# Patient Record
Sex: FEMALE | Race: BLACK OR AFRICAN AMERICAN | NOT HISPANIC OR LATINO
[De-identification: names, ages, dates, MRNs, and addresses within clinical notes are randomized per-mention and may not be internally consistent; named-entity substitution may affect disease eponyms.]

---

## 2019-08-16 ENCOUNTER — TRANSCRIPTION ENCOUNTER (OUTPATIENT)
Age: 24
End: 2019-08-16

## 2024-03-15 PROBLEM — Z00.00 ENCOUNTER FOR PREVENTIVE HEALTH EXAMINATION: Status: ACTIVE | Noted: 2024-03-15

## 2024-03-18 ENCOUNTER — APPOINTMENT (OUTPATIENT)
Dept: ORTHOPEDIC SURGERY | Facility: CLINIC | Age: 29
End: 2024-03-18
Payer: MEDICAID

## 2024-03-18 VITALS — SYSTOLIC BLOOD PRESSURE: 135 MMHG | HEART RATE: 65 BPM | DIASTOLIC BLOOD PRESSURE: 90 MMHG | HEIGHT: 67 IN

## 2024-03-18 DIAGNOSIS — S62.616A DISPLACED FRACTURE OF PROXIMAL PHALANX OF RIGHT LITTLE FINGER, INITIAL ENCOUNTER FOR CLOSED FRACTURE: ICD-10-CM

## 2024-03-18 DIAGNOSIS — M79.641 PAIN IN RIGHT HAND: ICD-10-CM

## 2024-03-18 PROCEDURE — 73130 X-RAY EXAM OF HAND: CPT | Mod: RT

## 2024-03-18 PROCEDURE — 99203 OFFICE O/P NEW LOW 30 MIN: CPT | Mod: 25

## 2024-03-18 NOTE — DISCUSSION/SUMMARY
[de-identified] : Right fifth proximal phalanx fracture with mild angulation, healing.  Discussed with her that while she does have some displacement of the fracture, the alignment is overall acceptable.  We discussed treatment options.  With nonsurgical treatment, she may have a mild deformity of the finger, but after the fracture heals and she does hand therapy, she should have good function of the finger.  We also discussed surgical treatment which include open reduction internal fixation.  While this would have the benefit of improving the alignment of the fracture, it would restart the fracture healing and full range of motion and function may still not be achieved.  After this discussion, she elected to proceed with nonsurgical treatment.  Hand therapy prescription was given to her today.  She will follow-up in 1 month for repeat evaluation and x-rays.  I have personally obtained the history, reviewed the ROS as noted, and performed the physical examination today. The patient and I discussed the assessment and options and developed the plan. All questions were answered and the patient stated their understanding of the treatment plan and appreciation of the visit.  My cumulative time spent on this patient's visit included: Preparation for the visit, review of the medical records, review of pertinent diagnostic studies, examination and counseling of the patient on the above diagnosis, treatment plan and prognosis, orders of diagnostic tests, medications and/or appropriate procedures and documentation in the medical records of today's visit.  This note was generated using dragon medical dictation software.  A reasonable effort has been made for proofreading its contents, but typos may still remain.  If there are any questions or points of clarification needed please notify my office.
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
## 2024-03-18 NOTE — PHYSICAL EXAM
[de-identified] : General: No apparent distress Cardiovascular: extremities warm and well-perfused, no cyanosis Pulmonary: non labored respirations  Right hand: Mild swelling about proximal phalanx Unable to fully straighten PIP and DIP joints actively, but does have full passive extension Unable to fully bring pinky finger into the palm with flexion Normal cascade of fingers with flexion [de-identified] : 3 views of the right hand obtained today and interpreted by me including AP, lateral, oblique views demonstrate base of fifth finger proximal phalanx transverse fracture with mild apex volar angulation.  There is mild callus formation at the fracture site   Images reviewed in detail with the patient

## 2024-04-22 ENCOUNTER — APPOINTMENT (OUTPATIENT)
Dept: ORTHOPEDIC SURGERY | Facility: CLINIC | Age: 29
End: 2024-04-22
Payer: MEDICAID

## 2024-04-22 VITALS — HEART RATE: 76 BPM | SYSTOLIC BLOOD PRESSURE: 136 MMHG | DIASTOLIC BLOOD PRESSURE: 86 MMHG

## 2024-04-22 PROCEDURE — 73140 X-RAY EXAM OF FINGER(S): CPT | Mod: F9

## 2024-04-22 PROCEDURE — 99213 OFFICE O/P EST LOW 20 MIN: CPT | Mod: 25

## 2024-04-22 NOTE — PHYSICAL EXAM
[de-identified] : General: No apparent distress Cardiovascular: extremities warm and well-perfused, no cyanosis Pulmonary: non labored respirations  Right hand: Mild swelling about proximal phalanx Unable to fully straighten PIP and DIP joints actively, but does have full passive extension Unable to fully bring pinky finger into the palm with flexion Normal cascade of fingers with flexion [de-identified] : 3 views of the right pinky finger obtained today and interpreted by me including AP, lateral, oblique views demonstrate base of fifth finger proximal phalanx transverse fracture with mild apex volar angulation, similar to previous x-rays and with interval increase in callus formation   Images reviewed in detail with the patient

## 2024-04-22 NOTE — DISCUSSION/SUMMARY
[de-identified] : Right fifth proximal phalanx fracture with mild angulation, healing.  I again discussed with her that the fracture appears to be healing in a slightly angulated position.  We discussed that she may have some deformity of the finger long-term with nonoperative treatment, but her pain and function will likely improve.  I recommend that she continue with the hand therapy to work on full range of motion of the hand.  I will see her back in 6 weeks for repeat evaluation and repeat x-rays.  I have personally obtained the history, reviewed the ROS as noted, and performed the physical examination today. The patient and I discussed the assessment and options and developed the plan. All questions were answered and the patient stated their understanding of the treatment plan and appreciation of the visit.  My cumulative time spent on this patient's visit included: Preparation for the visit, review of the medical records, review of pertinent diagnostic studies, examination and counseling of the patient on the above diagnosis, treatment plan and prognosis, orders of diagnostic tests, medications and/or appropriate procedures and documentation in the medical records of today's visit.  This note was generated using dragon medical dictation software.  A reasonable effort has been made for proofreading its contents, but typos may still remain.  If there are any questions or points of clarification needed please notify my office.
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
## 2024-06-03 ENCOUNTER — APPOINTMENT (OUTPATIENT)
Dept: ORTHOPEDIC SURGERY | Facility: CLINIC | Age: 29
End: 2024-06-03

## 2024-06-10 ENCOUNTER — APPOINTMENT (OUTPATIENT)
Dept: ORTHOPEDIC SURGERY | Facility: CLINIC | Age: 29
End: 2024-06-10